# Patient Record
(demographics unavailable — no encounter records)

---

## 2025-02-26 NOTE — PHYSICAL EXAM
[Chaperone Present] : A chaperone was present in the examining room during all aspects of the physical examination [Labia Majora] : normal [Labia Majora Erythema] : erythema [Labia Minora] : normal [Atrophy] : atrophy [No Bleeding] : There was no active vaginal bleeding [Soft] : soft [Normal] : normal [Normal Position] : in a normal position [Tenderness] : nontender [Enlarged ___ wks] : not enlarged [Mass ___ cm] : no uterine mass was palpated [Uterine Adnexae] : normal

## 2025-02-26 NOTE — HISTORY OF PRESENT ILLNESS
[FreeTextEntry1] :  S/P antibiotics for UTI X 2 Still C/O burning on urination All cultures negative, Urgent Visit.  Denies urgency, frequency, F/C or back pain  Seen at  ED 2/14  Given Rx for Cefpodoxime which was completed

## 2025-03-02 NOTE — CHIEF COMPLAINT
[Urgent Visit] : Urgent Visit [FreeTextEntry1] : S/P antibiotics for UTI X 2  Still C/O burning on urination All cultures negative

## 2025-05-22 NOTE — PHYSICAL EXAM
[MA] : MA [FreeTextEntry2] : Rosana [Labia Majora] : normal [Labia Minora] : normal [No Bleeding] : There was no active vaginal bleeding [Soft] : soft [Normal] : normal [Normal Position] : in a normal position [Tenderness] : nontender [Enlarged ___ wks] : not enlarged [Mass ___ cm] : no uterine mass was palpated [Uterine Adnexae] : normal

## 2025-05-28 NOTE — HISTORY OF PRESENT ILLNESS
[FreeTextEntry1] : F/U persistent urinary frequency S/P Augmentin BID X 7 days Vag culture + for Oren

## 2025-05-28 NOTE — PHYSICAL EXAM
[MA] : MA [Appropriately responsive] : appropriately responsive [Alert] : alert [No Acute Distress] : no acute distress [Soft] : soft [Non-tender] : non-tender [Non-distended] : non-distended [No HSM] : No HSM [FreeTextEntry2] : Juan Antonioone [FreeTextEntry6] : C/O inflamed nipples

## 2025-06-04 NOTE — PHYSICAL EXAM
[MA] : MA [Appropriately responsive] : appropriately responsive [Alert] : alert [No Acute Distress] : no acute distress [Examination Of The Breasts] : a normal appearance [Normal] : normal [No Discharge] : no discharge [No Masses] : no breast masses were palpable

## 2025-06-04 NOTE — REVIEW OF SYSTEMS
HYDROcodone-acetaminophen (NORCO) 5-325 MG per tablet 70 tablet        [Frequency] : frequency [Nipple Changes] : nipple changes [Negative] : Heme/Lymph

## 2025-06-04 NOTE — HISTORY OF PRESENT ILLNESS
[FreeTextEntry1] : Persistent urinary frequency S/P Rx augmentin C&S now + for citrobacter sensitive to Bactrim  Nipple tenderness and erythema now resolved with Bacitracin ointment BID